# Patient Record
(demographics unavailable — no encounter records)

---

## 2024-11-20 NOTE — ASSESSMENT
[FreeTextEntry1] :  The patient is being seen today for right elbow pain. It has been present for 6 or 7 months. She has been in physical therapy for it but she has not seen benefit to her pain. They have done massages at therapy as well as shock therapy. She has done some stretches 1-2x a day but has not for the last month. She has been wearing a tennis elbow brace without benefit. Her medical doctor referred her to pt.   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 The natural history of the pathology was explained in full to the patient in layman's terms. All questions were answered. The risks and benefits of surgical and non-surgical treatment alternatives were explained in full to the patient.   We discussed treatment options at [facility] including nsaids, tennis elbow strap, PT, activity modification, cortisone inj, sx .pt is aware that symptoms usually resolve on their own in 95-99% of people, but the timeframe is unknown.   Home exercises/stretches were demonstrated and the patient practiced them as well- They are to do the exercises for 5 minutes 4x a day.   Avoid repetitive wrist flexion   time was spent instructing the patient on appropriate placement of the elbow strap as well.  She will continue with therapy, wearing the tennis elbow strap, and at home stretches.  She will follow up in about 6 weeks for evaluation if she is not improving to discuss a possible injection.  We discussed that the tear is not so deep and it does not necessarily need to be surgically corrected.  If she fails conservative treatment and injections we will move forward with surgical intervention